# Patient Record
Sex: MALE | Race: BLACK OR AFRICAN AMERICAN | NOT HISPANIC OR LATINO | URBAN - METROPOLITAN AREA
[De-identification: names, ages, dates, MRNs, and addresses within clinical notes are randomized per-mention and may not be internally consistent; named-entity substitution may affect disease eponyms.]

---

## 2017-01-02 ENCOUNTER — EMERGENCY (EMERGENCY)
Facility: HOSPITAL | Age: 62
LOS: 1 days | Discharge: HOME | End: 2017-01-02
Attending: EMERGENCY MEDICINE | Admitting: EMERGENCY MEDICINE
Payer: MEDICAID

## 2017-01-02 VITALS
DIASTOLIC BLOOD PRESSURE: 89 MMHG | OXYGEN SATURATION: 99 % | RESPIRATION RATE: 20 BRPM | TEMPERATURE: 98 F | SYSTOLIC BLOOD PRESSURE: 125 MMHG | HEART RATE: 77 BPM

## 2017-01-02 DIAGNOSIS — F17.200 NICOTINE DEPENDENCE, UNSPECIFIED, UNCOMPLICATED: ICD-10-CM

## 2017-01-02 DIAGNOSIS — R20.0 ANESTHESIA OF SKIN: ICD-10-CM

## 2017-01-02 DIAGNOSIS — Z79.899 OTHER LONG TERM (CURRENT) DRUG THERAPY: ICD-10-CM

## 2017-01-02 DIAGNOSIS — G62.9 POLYNEUROPATHY, UNSPECIFIED: ICD-10-CM

## 2017-01-02 PROCEDURE — 99284 EMERGENCY DEPT VISIT MOD MDM: CPT | Mod: 25

## 2017-01-02 PROCEDURE — 93010 ELECTROCARDIOGRAM REPORT: CPT

## 2017-01-02 RX ORDER — GABAPENTIN 400 MG/1
300 CAPSULE ORAL ONCE
Qty: 0 | Refills: 0 | Status: COMPLETED | OUTPATIENT
Start: 2017-01-02 | End: 2017-01-02

## 2017-01-02 RX ADMIN — GABAPENTIN 300 MILLIGRAM(S): 400 CAPSULE ORAL at 15:23

## 2017-01-02 NOTE — ED PROVIDER NOTE - CONSTITUTIONAL, MLM
normal... Disheveled M, awake, alert, oriented to person, place, time/situation and in no apparent distress.

## 2017-01-02 NOTE — ED PROVIDER NOTE - OBJECTIVE STATEMENT
62 yo M with PMHx of neuropathy, presenting c/o numbness and pain to the L sided of the body today.  Was seen at [facility] yesterday for same cx but was not prescribed any pain meds. Denies HA, dizziness, tingling, photophobia, diplopia, change in vision/hearing/gait/mental status/speech, focal weakness, neck pain, rash, fever, chills, stiffness, CP, SOB, palpitations, diaphoresis, N/V/D/C, abdominal pain, and trauma. 62 yo M with PMHx of neuropathy, presenting c/o numbness and pain to the L sided of the body since yesterday. Was seen at [facility] yesterday for same cx but was not prescribed any pain meds. Denies HA, dizziness, tingling, photophobia, diplopia, change in vision/hearing/gait/mental status/speech, focal weakness, neck pain, rash, fever, chills, stiffness, CP, SOB, palpitations, diaphoresis, N/V/D/C, abdominal pain, and trauma.

## 2017-01-02 NOTE — ED ADULT NURSE NOTE - CHIEF COMPLAINT QUOTE
Pt complaining of left sided numbness which started yesterday. Pt just discharged from [facility] for same complaint. Pt uncooperative in triage

## 2017-01-02 NOTE — ED PROVIDER NOTE - MEDICAL DECISION MAKING DETAILS
pt with h/o neuropathy, seen at [facility] at time of d/c, pt non-toxic appearing and hemodynamically stable, d/c'd home to f/u with PMD, strict return precautions discussed with pt, prompt return to ER for any worsening or new sx, pt verbalized understanding. pt with h/o neuropathy, seen at [facility] yesterday, no associated weakness, noted pain on the L side, was not given any pain meds, gabapentin given, EKG, FS wnl, no focal neuro deficits on exam, AFVSS at time of d/c, pt non-toxic appearing and hemodynamically stable, d/c'd home to f/u with PMD and neuro, strict return precautions discussed with pt, prompt return to ER for any worsening or new sx, pt verbalized understanding.

## 2017-01-02 NOTE — ED PROVIDER NOTE - NEUROLOGICAL, MLM
Alert and oriented, no focal deficits, no motor or sensory deficits. cerebellar function intact, negative pronator drift, ambulatory without gait disturbance, strength symmetric BUE/BLE, 5/5

## 2017-01-02 NOTE — ED ADULT TRIAGE NOTE - CHIEF COMPLAINT QUOTE
Pt complaining of left sided numbness which started yesterday. Pt just discharged from [facility] for same complaint. Pt uncooperative in triage